# Patient Record
Sex: FEMALE | Race: WHITE | NOT HISPANIC OR LATINO | URBAN - METROPOLITAN AREA
[De-identification: names, ages, dates, MRNs, and addresses within clinical notes are randomized per-mention and may not be internally consistent; named-entity substitution may affect disease eponyms.]

---

## 2017-03-09 ENCOUNTER — COMMUNICATION - RIVER FALLS (OUTPATIENT)
Dept: FAMILY MEDICINE | Facility: CLINIC | Age: 22
End: 2017-03-09

## 2017-03-09 ENCOUNTER — OFFICE VISIT - RIVER FALLS (OUTPATIENT)
Dept: FAMILY MEDICINE | Facility: CLINIC | Age: 22
End: 2017-03-09

## 2017-03-09 ASSESSMENT — MIFFLIN-ST. JEOR: SCORE: 1607.25

## 2017-03-10 ENCOUNTER — COMMUNICATION - RIVER FALLS (OUTPATIENT)
Dept: FAMILY MEDICINE | Facility: CLINIC | Age: 22
End: 2017-03-10

## 2017-03-10 ENCOUNTER — OFFICE VISIT - RIVER FALLS (OUTPATIENT)
Dept: FAMILY MEDICINE | Facility: CLINIC | Age: 22
End: 2017-03-10

## 2017-03-10 ASSESSMENT — MIFFLIN-ST. JEOR: SCORE: 1636.28

## 2017-07-14 ENCOUNTER — OFFICE VISIT - RIVER FALLS (OUTPATIENT)
Dept: FAMILY MEDICINE | Facility: CLINIC | Age: 22
End: 2017-07-14

## 2017-07-14 ASSESSMENT — MIFFLIN-ST. JEOR: SCORE: 1629.93

## 2017-07-17 ENCOUNTER — AMBULATORY - RIVER FALLS (OUTPATIENT)
Dept: FAMILY MEDICINE | Facility: CLINIC | Age: 22
End: 2017-07-17

## 2018-04-03 ENCOUNTER — OFFICE VISIT - RIVER FALLS (OUTPATIENT)
Dept: FAMILY MEDICINE | Facility: CLINIC | Age: 23
End: 2018-04-03

## 2018-04-03 ASSESSMENT — MIFFLIN-ST. JEOR: SCORE: 1651.7

## 2018-05-01 ENCOUNTER — OFFICE VISIT - RIVER FALLS (OUTPATIENT)
Dept: FAMILY MEDICINE | Facility: CLINIC | Age: 23
End: 2018-05-01

## 2018-05-01 ASSESSMENT — MIFFLIN-ST. JEOR: SCORE: 1648.08

## 2018-05-16 ENCOUNTER — AMBULATORY - RIVER FALLS (OUTPATIENT)
Dept: FAMILY MEDICINE | Facility: CLINIC | Age: 23
End: 2018-05-16

## 2018-08-14 ENCOUNTER — OFFICE VISIT - RIVER FALLS (OUTPATIENT)
Dept: FAMILY MEDICINE | Facility: CLINIC | Age: 23
End: 2018-08-14

## 2022-02-11 VITALS
HEIGHT: 63 IN | HEIGHT: 63 IN | BODY MASS INDEX: 36.21 KG/M2 | DIASTOLIC BLOOD PRESSURE: 77 MMHG | WEIGHT: 198 LBS | SYSTOLIC BLOOD PRESSURE: 129 MMHG | HEART RATE: 89 BPM | SYSTOLIC BLOOD PRESSURE: 113 MMHG | DIASTOLIC BLOOD PRESSURE: 73 MMHG | WEIGHT: 204.4 LBS | HEART RATE: 86 BPM | BODY MASS INDEX: 35.08 KG/M2

## 2022-02-12 VITALS
HEIGHT: 63 IN | WEIGHT: 207.8 LBS | HEART RATE: 72 BPM | HEART RATE: 90 BPM | BODY MASS INDEX: 36.68 KG/M2 | DIASTOLIC BLOOD PRESSURE: 72 MMHG | TEMPERATURE: 99.7 F | DIASTOLIC BLOOD PRESSURE: 79 MMHG | SYSTOLIC BLOOD PRESSURE: 124 MMHG | BODY MASS INDEX: 36.82 KG/M2 | WEIGHT: 207 LBS | SYSTOLIC BLOOD PRESSURE: 136 MMHG | HEIGHT: 63 IN | TEMPERATURE: 99.5 F

## 2022-02-12 VITALS — HEART RATE: 80 BPM | DIASTOLIC BLOOD PRESSURE: 70 MMHG | SYSTOLIC BLOOD PRESSURE: 122 MMHG | TEMPERATURE: 98.7 F

## 2022-02-12 VITALS
DIASTOLIC BLOOD PRESSURE: 74 MMHG | HEIGHT: 63 IN | BODY MASS INDEX: 35.97 KG/M2 | WEIGHT: 203 LBS | SYSTOLIC BLOOD PRESSURE: 126 MMHG | HEART RATE: 76 BPM

## 2022-02-16 NOTE — PROGRESS NOTES
Patient:   JANES MAN            MRN: [id number]            FIN: [id number]               Age:   22 years     Sex:  Female     :  1995   Associated Diagnoses:   Pre-employment examination   Author:   Melanie Newman      Visit Information      Date of Service: 2017 02:00 pm  Performing Location: [facility]  Encounter#: [id number]      Primary Care Provider (PCP):  [id number] VINCENT ESCOBAR      Chief Complaint   2017 2:17 PM CDT    Pre-employment px - [employer].      Well Adult History   PPC for her pre-empoyment exam, will be starting at [employer]      Review of Systems   Constitutional:  Negative.    Eye:  Negative.    Ear/Nose/Mouth/Throat:  Negative.    Respiratory:  Negative, social smoking, no known Tb exposure, [place].    Cardiovascular:  Negative.    Gastrointestinal:  Negative, remote hx of IBS and reflux.    Genitourinary:  Negative.    Hematology/Lymphatics:  Negative.    Endocrine:  Negative.    Immunologic:  Negative.    Musculoskeletal:  Negative.    Integumentary:  Negative.    Neurologic:  Negative.    Psychiatric:  Negative.             Health Status   Allergies:    Allergic Reactions (Selected)  Severity Not Documented  Chlorine (No reactions were documented)  Milk Products (No reactions were documented)   Problem list:    All Problems  Dyspepsia and Other Specified Disorders of Function of Stomach / ICD-9-.8 / Confirmed  Esophageal reflux / SNOMED CT 5010756900 / Confirmed  H/O migraine with aura / SNOMED CT 5280983082 / Confirmed  Intestinal disaccharidase deficiencies and disaccharide malabsorption / SNOMED CT 25873326 / Confirmed  Obesity, unspecified / SNOMED CT 8114676843 / Probable  Other acne / SNOMED CT 00709786 / Confirmed      Histories   Family History:    No family history items have been selected or recorded.   Procedure history:    No active procedure history items have been selected or recorded.    Social History:             No active social history items have been recorded.      Physical Examination   Vital Signs   7/14/2017 2:17 PM CDT Peripheral Pulse Rate 76 bpm    Pulse Site Radial artery    HR Method Manual    Systolic Blood Pressure 126 mmHg    Diastolic Blood Pressure 74 mmHg    Mean Arterial Pressure 91 mmHg    BP Site Right arm    BP Method Manual      Measurements from flowsheet : Measurements   7/14/2017 2:17 PM CDT Height Measured - Standard 63 in    Weight Measured - Standard 203 lb    BSA 2.02 m2    Body Mass Index 35.96 kg/m2      General:  Alert and oriented, No acute distress.    Eye:  Pupils are equal, round and reactive to light, Intact accommodation, Normal conjunctiva, Vision unchanged.         Periorbital area: Within normal limits.    HENT:  Normocephalic, Tympanic membranes are clear, Normal hearing, Oral mucosa is moist, No pharyngeal erythema, No sinus tenderness.    Neck:  Supple, Non-tender, No lymphadenopathy, No thyromegaly.    Respiratory:  Lungs are clear to auscultation, Respirations are non-labored, No chest wall tenderness.    Cardiovascular:  Normal rate, Regular rhythm, No murmur, No edema.    Gastrointestinal:  Soft, Non-tender, Non-distended, Normal bowel sounds, No organomegaly.    Genitourinary:  No costovertebral angle tenderness.    Lymphatics:  No lymphadenopathy neck, axilla, groin.    Musculoskeletal:  Normal range of motion, Normal strength, No swelling.    Integumentary:  Warm, Dry, Pink.    Neurologic:  Alert, Oriented, Normal sensory.    Psychiatric:  Cooperative, Appropriate mood & affect.       Impression and Plan   Diagnosis     Pre-employment examination (DUW35-YH Z02.1).     Summary:  pending Tb read I have no concerns with pt reported hx or physical for her employment.

## 2022-02-16 NOTE — PROGRESS NOTES
Patient:   JANES MAN            MRN: [id number]            FIN: [id number]               Age:   21 years     Sex:  Female     :  1995   Associated Diagnoses:   Pelvic pain   Author:   Ysabel Uribe      Visit Information      Date of Service: 2017 08:21 am  Performing Location: [facility]  Encounter#: [id number]      Primary Care Provider (PCP):  [id number] ROSS   Visit type:  Increase in symptoms.    Accompanied by:  No one.       Chief Complaint   3/9/2017 8:22 AM CST     Patient is here to discuss increased right ovarian pain last 7 months.  Also got period 10 days early this month with increased cramping.  Has not been on OCP for 3 or 4 years        History of Present Illness   Pt is a 22 yo  with complaints of right sided pelvic pain x 7 months, off/on, unrelated to cycle or any other identifiable factor.  Her menstrual cycle is typically 24-27 days, lasting 4-7 days, requring 5-6 super absorbancy tampons with back up sanitary pad per 24 hours.  This month her cycle was 10 days earlier than expected.  She uses condoms consistently.  She is in a relationship with one partner, they are mutually monogamous.  She had no previous partners before this relationship.  He tested negative for STIs prior to start of their relationship.  She has never been tested for STIs.  She occasionally has pain during sexual intercourse.  She took a UPT at home 3 days ago and it was negative.  She denies problems with constipation, diarrhea or abdominal gas.  She has a history of menorrhagia, migraine with aura (temp loss of vision in right eye), acne, overweight.  She took combined ocps for a few months, but stopped taking them 3-4 years ago.      Review of Systems   Constitutional:  Negative.    Eye:  Negative.    Ear/Nose/Mouth/Throat:  Negative.    Gastrointestinal:  Negative except as documented in history of present illness.    Genitourinary:  Negative except as  documented in history of present illness.    Gynecologic:  Negative except as documented in history of present illness.    Immunologic:  Negative.    Musculoskeletal:  Negative.    Neurologic:  Alert and oriented X4.    Psychiatric:  Negative.       Health Status   Allergies:    Allergic Reactions (Selected)  Severity Not Documented  Chlorine (No reactions were documented)  Milk Products (No reactions were documented)   Medications:  (Selected)      Problem list:    All Problems  Dyspepsia and Other Specified Disorders of Function of Stomach / ICD-9-.8 / Confirmed  Esophageal Reflux / ICD-9-.81 / Confirmed  H/O migraine with aura / SNOMED CT 9280991468 / Confirmed  Intestinal Disaccharidase Deficiencies and Disaccharide Malabsorption / ICD-9-.3 / Confirmed  Obesity, Unspecified / ICD-9-.00 / Probable  Other Acne / ICD-9-.1 / Confirmed      Histories   Past Medical History:    Active  H/O migraine with aura (1457184389): Onset in 2011 at 16 years.   Family History:    No family history items have been selected or recorded.   Procedure history:    No active procedure history items have been selected or recorded.   Social History:             No active social history items have been recorded., Student at [employer], studying social work.      Physical Examination   Last Menstrual Period: 3/1/2017     Vital Signs   3/9/2017 8:22 AM CST Peripheral Pulse Rate 89 bpm    HR Method Electronic    Systolic Blood Pressure 113 mmHg    Diastolic Blood Pressure 73 mmHg    Mean Arterial Pressure 86 mmHg    BP Site Right arm    BP Method Electronic      Measurements from flowsheet : Measurements   3/9/2017 8:22 AM CST Height Measured - Standard 63 in    Weight Measured - Standard 198 lb    BSA 2 m2    Body Mass Index 35.07 kg/m2    Ht/Wt Measurement Refused by Patient? No      General:  Alert and oriented, No acute distress.    Eye:  Vision unchanged.    HENT:  Normocephalic, Normal hearing,   .     Cardiovascular:  Normal rate, Regular rhythm.    Genitourinary:          Vagina: Discharge ( no discharge noted ), No bleeding.         Uterus: diffuse pelvic tenderness with bimanual exam.    Gynecologic   Musculoskeletal:  Normal gait.    Integumentary:  Warm, Dry, Pink, No rash.    Neurologic:  Alert, Oriented.    Psychiatric:  Cooperative, Appropriate mood & affect, Normal judgment.       Health Maintenance   Immunizations:  Up to date per patient.       Review / Management   Differential diagnosis:  Ovarian cyst, endometriosis, PCOS, ovarian torsion, ectopic pregnancy, gas pains.    Results review:  Lab results: 3/9/2017 8:51 AM CST     U Preg                    Negative  .         Interpretation: Normal results.       Impression and Plan   Diagnosis     Pelvic pain (JTE88-SY R10.2).     Plan:  Scheduled appointment with Dr He for tomorrow.  .    Patient Instructions:       Counseled: Patient.       Professional Services   Counseling Summary:     Counseling included differential diagnoses.    The patient was interactive.

## 2022-02-16 NOTE — PROGRESS NOTES
1) Start pill with your next cycle. It is a low dose pill. Take at the same time every day. Use an shreya to remind yourself  2) Check BP after 2 weeks - should be under 130/80  3) If headaches worsen you must stop the pill  4) Start topical gel - antibiotic and benzoyl peroxide daily - it will make it worse at first and then better - will take a month or more to see improvement  5) If this is not enough - we will add in Retin A.  6) Come back in 2 months.

## 2022-02-16 NOTE — PROGRESS NOTES
Patient:   JANES MAN            MRN: [id number]            FIN: [id number]               Age:   22 years     Sex:  Female     :  1995   Associated Diagnoses:   Acne   Author:   Rosa Rivas      Visit Information      Date of Service: 2018 01:48 pm  Performing Location: [facility]  Encounter#: [id number]      Primary Care Provider (PCP):  [id number] ROSS      Referring Provider:  Rosa Rivas    NPI# [id number]      Chief Complaint   4/3/2018 1:50 PM CDT     discuss birth control options      History of Present Illness   Chief complaint reviewed and confirmed with patient.   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  Her face is covered with acne.  She also has some acne on her body as well.  She would like to try to use a birth control pill to help with her acne.  She was previously on Kamilah a few years ago for irregular cycles and found that she had spotting on this.  She did not have an elevated blood pressure or increased headaches with it.  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  This occurs approximately once every other month.  It is in one eye only.  No other headache symptoms.  She does not need contraception.  She is not sexually active.          Review of Systems   Constitutional:  Negative.    Respiratory:  Negative.    Cardiovascular:  Negative.    Gynecologic:  Negative.    Integumentary:  Negative except as documented in history of present illness.    Neurologic:  Negative except as documented in history of present illness.       Health Status   Allergies:    Allergic Reactions (Selected)  Severity Not Documented  Chlorine (No reactions were documented)  Milk Products (No reactions were documented)   Medications:  (Selected)   Prescriptions  Prescribed  benzoyl peroxide-clindamycin 5%-1% topical gel: 1 shreya, TOP, BID, # 25 g, 3 Refill(s), Type: Maintenance, [facility], 1 shreya top bid  ethinyl estradiol-norgestimate triphasic 25 mcg oral tablet: 1 tab(s), PO, Daily, # 90 tab(s), 3 Refill(s), Type: Maintenance, Pharmacy: [facility], 1 tab(s) po daily  Documented Medications  Documented  Melatonin: ( 10 mg ), hs, 0 Refill(s), Type: Maintenance   Problem list:    All Problems  Other acne / SNOMED CT 05067898 / Confirmed  Dyspepsia and Other Specified Disorders of Function of Stomach / ICD-9-.8 / Confirmed  Esophageal reflux / SNOMED CT 5771113068 / Confirmed  H/O migraine with aura / SNOMED CT 9086630164 / Confirmed  Intestinal disaccharidase deficiencies and disaccharide malabsorption / SNOMED CT 03269353 / Confirmed  Obesity, unspecified / SNOMED CT 6419267700 / Probable      Histories   Past Medical History:    Active  H/O migraine with aura (2619080076): Onset in 2011 at 16 years.  Esophageal reflux (6179349100)  Intestinal disaccharidase deficiencies and disaccharide malabsorption (94103693)  Other acne (69582228)  Obesity, unspecified (1128795538)   Family History:    No family history items have been selected or recorded.   Procedure history:    No active procedure history items have been selected or recorded.   Social History:             No active social history items have been recorded.      Physical Examination   Vital Signs   4/3/2018 1:50 PM CDT Temperature Tympanic 99.5 DegF    Peripheral Pulse Rate 72 bpm    Pulse Site Radial artery    HR Method Manual    Systolic Blood Pressure 124 mmHg    Diastolic Blood Pressure 72 mmHg    Mean Arterial Pressure 89 mmHg    BP Site Right arm    BP Method Manual      Measurements from flowsheet : Measurements   4/3/2018 1:50 PM CDT Height Measured - Standard 63 in    Weight Measured - Standard 207.8 lb    BSA 2.04 m2    Body Mass Index 36.81 kg/m2  HI      General:  Alert and oriented, No acute distress.    Integumentary:       Integumentary exam: Face, Lesion ( Primary, Papule, Pustule ).     Neurologic:  Alert, Oriented.    Psychiatric:  Cooperative, Appropriate mood & affect.       Impression and Plan   Diagnosis     Acne (VZU83-VT L70.9).     Course:  Worsening.    Plan:    1. The benefits and risks of different treatment options were discussed at length.  I did consult with Dr. Jones and Dr. Salas because of her ocular migraine history.    2. Discussed with the patient trying a low-dose of a birth control pill.  If she has an increase in headaches she will need to stop the pill, and she understands this.  3. Recheck blood pressure two weeks after starting birth control pills.  4. In addition to using Ortho Tri-Cyclen LO to help with acne, we will start the patient on topical benzoyl peroxide antibiotic topical.  Explained to the patient it will take at least four weeks to see a difference.  If this is not enough we could add in a Retin-A product as well.  I spent a full 20 minutes with the patient today.  All of this time was in coordination of care and  with just a brief visual inspection of her skin.  .    Patient Instructions:       Counseled: Patient, Regarding diagnosis, Regarding treatment, Regarding medications, Verbalized understanding.

## 2022-02-16 NOTE — PROGRESS NOTES
Patient:   JANES MAN            MRN: [id number]            FIN: [id number]               Age:   21 years     Sex:  Female     :  1995   Associated Diagnoses:   Abdominal and pelvic pain   Author:   Micky He MD      Visit Information      Date of Service: 03/10/2017 01:12 pm  Performing Location: [facility]  Encounter#: [id number]      Primary Care Provider (PCP):  [id number] VINCENT ESCOBAR      Referring Provider:  No referring provider recorded for selected visit.      Chief Complaint   3/10/2017 1:16 PM CST    pelvic U/S per [facility] for pelvic pain      Interval History   The patient is a 21-year-old female referred by CARL Johnson, for pelvic ultrasound regarding left lower abdominal pain.  The patient reports that she has had pain on an intermittent basis for about seven months pretty much unrelated to activities and her menstrual cycle.  Her periods have been fairly normal without much of a change over that time.  She has no intermenstrual bleeding or discharge.  Her sexual partner was tested negatively for STDs and she has not yet been tested.  She has not had pelvic surgery.  She has no other issues that she is aware of.      Review of Systems   Review  of systems is negative except as documented under interval history.      Health Status   Allergies:    Allergic Reactions (Selected)  Severity Not Documented  Chlorine (No reactions were documented)  Milk Products (No reactions were documented)   Medications:  (Selected)      Problem list:    All Problems  Esophageal Reflux / ICD-9-.81 / Confirmed  Intestinal Disaccharidase Deficiencies and Disaccharide Malabsorption / ICD-9-.3 / Confirmed  Other Acne / ICD-9-.1 / Confirmed  Dyspepsia and Other Specified Disorders of Function of Stomach / ICD-9-.8 / Confirmed  Obesity, Unspecified / ICD-9-.00 / Probable  H/O migraine with aura / SNOMED CT 3922134609 / Confirmed      Histories   Past  Medical History:    Active  H/O migraine with aura (6331378186): Onset in 2011 at 16 years.   Family History:    No family history items have been selected or recorded.   Procedure history:    No active procedure history items have been selected or recorded.   Social History:             No active social history items have been recorded.      Physical Examination   Vital Signs   3/10/2017 1:16 PM CST Peripheral Pulse Rate 86 bpm    Pulse Site Radial artery    HR Method Electronic    Systolic Blood Pressure 129 mmHg    Diastolic Blood Pressure 77 mmHg    Mean Arterial Pressure 94 mmHg    BP Site Right arm    BP Method Electronic      Gastrointestinal:  Abdomen is soft and nontender without masses.  Ultrasound is done..       Review / Management   Radiology results   Ultrasound, Combined abdominal pelvic ultrasound finds normal uterus measuring 8 x 5 x 4 cm.  The endometrium is early proliferative at about 6 mm thick with no fluid in the uterus or abnormalities of the endometrium.  The myometrium is normal with no fibroids seen.  There is no fluid in the cul-de-sac.  The ovaries are normal bilaterally.  The left ovary could only be seen on abdominal ultrasound but appeared normal.  The right measures 3 cm and the left 2 cm.  There did not seem to be any pelvic tenderness with using the vaginal probe.      Impression and Plan   Diagnosis     Abdominal and pelvic pain (DRE86-GA R10).     Left lower abdominal discomfort unrelated to cycles probably not GYN etiology..     Plan:  The patient was advised that if her pain worsens she can return for further discussion and could at some point in the future be a candidate for laparoscopy but I would not suggest this at the moment.  I tried to reassure her and she will return as needed..    Patient Instructions:       Counseled: Patient, Regarding diagnosis, Regarding treatment, Verbalized understanding.

## 2022-02-16 NOTE — PROGRESS NOTES
Patient:   JANES MAN            MRN: [id number]            FIN: [id number]               Age:   22 years     Sex:  Female     :  1995   Associated Diagnoses:   Medication intolerance   Author:   Rosa Rivsa      Visit Information      Date of Service: 2018 03:19 pm  Performing Location: [facility]  Encounter#: [id number]      Primary Care Provider (PCP):  [id number] ROSS      Referring Provider:  Rosa Rivas    NPI# [id number]      Chief Complaint   2018 3:30 PM CDT     Patient here for follow up after starting a birth control.      History of Present Illness   Chief complaint reviewed and confirmed with patient.   The patient is a [employer] student.  She was started on a low-dose birth control pill and topical benzoyl peroxide one month ago by me for acne.  She feels that the acne has improved.  However, before she started on the pill she was beginning to have issues with heart palpitations and feeling some shortness of breath.  She feels that the heart palpitations and shortness of breath have gotten worsen since being on the pill.  It happens daily sometimes five times a week.  She denies feeling weak or faint or any other symptoms.           Review of Systems   Constitutional:  Negative.    Respiratory:  Negative except as documented in history of present illness.    Cardiovascular:  Negative except as documented in history of present illness.    Integumentary:  Negative except as documented in history of present illness.       Health Status   Allergies:    Allergic Reactions (Selected)  Severity Not Documented  Chlorine (No reactions were documented)  Milk Products (No reactions were documented)   Medications:  (Selected)   Prescriptions  Prescribed  benzoyl peroxide-clindamycin 5%-1% topical gel: 1 shreya, TOP, BID, # 25 g, 3 Refill(s), Type: Maintenance, Pharmacy: [facility], 1 shreya top  bid  ethinyl estradiol-norgestimate triphasic 25 mcg oral tablet: 1 tab(s), PO, Daily, # 90 tab(s), 3 Refill(s), Type: Maintenance, [facility], 1 tab(s) po daily  Documented Medications  Documented  Melatonin: ( 10 mg ), hs, 0 Refill(s), Type: Maintenance   Problem list:    All Problems  Other acne / SNOMED CT 25764406 / Confirmed  Dyspepsia and Other Specified Disorders of Function of Stomach / ICD-9-.8 / Confirmed  Esophageal reflux / SNOMED CT 2534379113 / Confirmed  H/O migraine with aura / SNOMED CT 3872534992 / Confirmed  Intestinal disaccharidase deficiencies and disaccharide malabsorption / SNOMED CT 30016147 / Confirmed  Obesity, unspecified / SNOMED CT 3287384115 / Probable      Histories   Past Medical History:    Active  H/O migraine with aura (7344807185): Onset in 2011 at 16 years.  Esophageal reflux (5975590437)  Intestinal disaccharidase deficiencies and disaccharide malabsorption (00770279)  Other acne (35271448)  Obesity, unspecified (3995780137)   Family History:    No family history items have been selected or recorded.   Procedure history:    No active procedure history items have been selected or recorded.   Social History:             No active social history items have been recorded.      Physical Examination   Vital Signs   5/1/2018 3:30 PM CDT Temperature Tympanic 99.7 DegF    Peripheral Pulse Rate 90 bpm    Pulse Site Brachial artery    HR Method Electronic    Systolic Blood Pressure 136 mmHg  HI    Diastolic Blood Pressure 79 mmHg    Mean Arterial Pressure 98 mmHg    BP Site Right arm    BP Method Electronic      Measurements from flowsheet : Measurements   5/1/2018 3:30 PM CDT Height Measured - Standard 63 in    Weight Measured - Standard 207 lb    BSA 2.04 m2    Body Mass Index 36.66 kg/m2  HI      General:  Alert and oriented, No acute distress.    Integumentary:  decrease in pustules and papules.       Impression and Plan   Diagnosis     Medication  intolerance (GAM58-KH Z78.9).     Course:  Worsening.    Plan:    1. We will have the patient stop the pill.  If it is related to the pill her symptoms should resolve.  2. If the symptoms do not resolve I have asked the patient to follow up in clinic for lab work and other testing.    3. I offered lab work and testing with her today but she had an appointment and at this point in time would like to just see how she feels when she stops the pill.    4. Continue with the benzoyl peroxide as needed.  I spent 15 minutes with the patient today.  All of this time was in coordination of care and ..    Patient Instructions:       Counseled: Patient, Regarding medications, Verbalized understanding.